# Patient Record
Sex: MALE | Race: ASIAN | NOT HISPANIC OR LATINO | URBAN - METROPOLITAN AREA
[De-identification: names, ages, dates, MRNs, and addresses within clinical notes are randomized per-mention and may not be internally consistent; named-entity substitution may affect disease eponyms.]

---

## 2018-09-01 ENCOUNTER — OUTPATIENT (OUTPATIENT)
Dept: OUTPATIENT SERVICES | Facility: HOSPITAL | Age: 28
LOS: 1 days | End: 2018-09-01
Payer: MEDICAID

## 2018-09-01 PROCEDURE — G9001: CPT

## 2018-09-02 ENCOUNTER — EMERGENCY (EMERGENCY)
Facility: HOSPITAL | Age: 28
LOS: 1 days | Discharge: HOME | End: 2018-09-02
Attending: EMERGENCY MEDICINE
Payer: MEDICAID

## 2018-09-02 VITALS
HEART RATE: 63 BPM | SYSTOLIC BLOOD PRESSURE: 109 MMHG | RESPIRATION RATE: 16 BRPM | DIASTOLIC BLOOD PRESSURE: 66 MMHG | TEMPERATURE: 98 F | OXYGEN SATURATION: 99 %

## 2018-09-02 VITALS
HEART RATE: 91 BPM | SYSTOLIC BLOOD PRESSURE: 134 MMHG | RESPIRATION RATE: 22 BRPM | TEMPERATURE: 98 F | OXYGEN SATURATION: 100 % | DIASTOLIC BLOOD PRESSURE: 76 MMHG

## 2018-09-02 DIAGNOSIS — F39 UNSPECIFIED MOOD [AFFECTIVE] DISORDER: ICD-10-CM

## 2018-09-02 DIAGNOSIS — F19.20 OTHER PSYCHOACTIVE SUBSTANCE DEPENDENCE, UNCOMPLICATED: ICD-10-CM

## 2018-09-02 LAB
ALBUMIN SERPL ELPH-MCNC: 4.7 G/DL — SIGNIFICANT CHANGE UP (ref 3.3–5)
ALP SERPL-CCNC: 82 U/L — SIGNIFICANT CHANGE UP (ref 40–120)
ALT FLD-CCNC: 30 U/L — SIGNIFICANT CHANGE UP (ref 10–45)
ANION GAP SERPL CALC-SCNC: 24 MMOL/L — HIGH (ref 5–17)
APAP SERPL-MCNC: <15 UG/ML — SIGNIFICANT CHANGE UP (ref 10–30)
AST SERPL-CCNC: 20 U/L — SIGNIFICANT CHANGE UP (ref 10–40)
BASOPHILS # BLD AUTO: 0.1 K/UL — SIGNIFICANT CHANGE UP (ref 0–0.2)
BASOPHILS NFR BLD AUTO: 0.5 % — SIGNIFICANT CHANGE UP (ref 0–2)
BILIRUB SERPL-MCNC: 0.7 MG/DL — SIGNIFICANT CHANGE UP (ref 0.2–1.2)
BUN SERPL-MCNC: 6 MG/DL — LOW (ref 7–23)
CALCIUM SERPL-MCNC: 9.5 MG/DL — SIGNIFICANT CHANGE UP (ref 8.4–10.5)
CHLORIDE SERPL-SCNC: 99 MMOL/L — SIGNIFICANT CHANGE UP (ref 96–108)
CO2 SERPL-SCNC: 16 MMOL/L — LOW (ref 22–31)
CREAT SERPL-MCNC: 1.15 MG/DL — SIGNIFICANT CHANGE UP (ref 0.5–1.3)
EOSINOPHIL # BLD AUTO: 0.3 K/UL — SIGNIFICANT CHANGE UP (ref 0–0.5)
EOSINOPHIL NFR BLD AUTO: 2 % — SIGNIFICANT CHANGE UP (ref 0–6)
ETHANOL SERPL-MCNC: SIGNIFICANT CHANGE UP MG/DL (ref 0–10)
GAS PNL BLDV: SIGNIFICANT CHANGE UP
GAS PNL BLDV: SIGNIFICANT CHANGE UP
GLUCOSE SERPL-MCNC: 215 MG/DL — HIGH (ref 70–99)
HCT VFR BLD CALC: 50.5 % — HIGH (ref 39–50)
HGB BLD-MCNC: 16.5 G/DL — SIGNIFICANT CHANGE UP (ref 13–17)
LYMPHOCYTES # BLD AUTO: 26.9 % — SIGNIFICANT CHANGE UP (ref 13–44)
LYMPHOCYTES # BLD AUTO: 4 K/UL — HIGH (ref 1–3.3)
MCHC RBC-ENTMCNC: 30.4 PG — SIGNIFICANT CHANGE UP (ref 27–34)
MCHC RBC-ENTMCNC: 32.7 GM/DL — SIGNIFICANT CHANGE UP (ref 32–36)
MCV RBC AUTO: 93 FL — SIGNIFICANT CHANGE UP (ref 80–100)
MONOCYTES # BLD AUTO: 0.8 K/UL — SIGNIFICANT CHANGE UP (ref 0–0.9)
MONOCYTES NFR BLD AUTO: 5.1 % — SIGNIFICANT CHANGE UP (ref 2–14)
NEUTROPHILS # BLD AUTO: 9.7 K/UL — HIGH (ref 1.8–7.4)
NEUTROPHILS NFR BLD AUTO: 65.5 % — SIGNIFICANT CHANGE UP (ref 43–77)
PLATELET # BLD AUTO: 345 K/UL — SIGNIFICANT CHANGE UP (ref 150–400)
POTASSIUM SERPL-MCNC: 3.7 MMOL/L — SIGNIFICANT CHANGE UP (ref 3.5–5.3)
POTASSIUM SERPL-SCNC: 3.7 MMOL/L — SIGNIFICANT CHANGE UP (ref 3.5–5.3)
PROT SERPL-MCNC: 8.2 G/DL — SIGNIFICANT CHANGE UP (ref 6–8.3)
RBC # BLD: 5.43 M/UL — SIGNIFICANT CHANGE UP (ref 4.2–5.8)
RBC # FLD: 12.3 % — SIGNIFICANT CHANGE UP (ref 10.3–14.5)
SALICYLATES SERPL-MCNC: <2 MG/DL — LOW (ref 15–30)
SODIUM SERPL-SCNC: 139 MMOL/L — SIGNIFICANT CHANGE UP (ref 135–145)
TSH SERPL-MCNC: 0.25 UIU/ML — LOW (ref 0.27–4.2)
WBC # BLD: 14.8 K/UL — HIGH (ref 3.8–10.5)
WBC # FLD AUTO: 14.8 K/UL — HIGH (ref 3.8–10.5)

## 2018-09-02 PROCEDURE — 82947 ASSAY GLUCOSE BLOOD QUANT: CPT

## 2018-09-02 PROCEDURE — 82330 ASSAY OF CALCIUM: CPT

## 2018-09-02 PROCEDURE — 82550 ASSAY OF CK (CPK): CPT

## 2018-09-02 PROCEDURE — 99285 EMERGENCY DEPT VISIT HI MDM: CPT | Mod: 25

## 2018-09-02 PROCEDURE — 84295 ASSAY OF SERUM SODIUM: CPT

## 2018-09-02 PROCEDURE — 85014 HEMATOCRIT: CPT

## 2018-09-02 PROCEDURE — 85027 COMPLETE CBC AUTOMATED: CPT

## 2018-09-02 PROCEDURE — 70450 CT HEAD/BRAIN W/O DYE: CPT | Mod: 26

## 2018-09-02 PROCEDURE — 99291 CRITICAL CARE FIRST HOUR: CPT

## 2018-09-02 PROCEDURE — 82435 ASSAY OF BLOOD CHLORIDE: CPT

## 2018-09-02 PROCEDURE — 83605 ASSAY OF LACTIC ACID: CPT

## 2018-09-02 PROCEDURE — 71045 X-RAY EXAM CHEST 1 VIEW: CPT | Mod: 26

## 2018-09-02 PROCEDURE — 82803 BLOOD GASES ANY COMBINATION: CPT

## 2018-09-02 PROCEDURE — 71045 X-RAY EXAM CHEST 1 VIEW: CPT

## 2018-09-02 PROCEDURE — 84443 ASSAY THYROID STIM HORMONE: CPT

## 2018-09-02 PROCEDURE — 84132 ASSAY OF SERUM POTASSIUM: CPT

## 2018-09-02 PROCEDURE — 82565 ASSAY OF CREATININE: CPT

## 2018-09-02 PROCEDURE — 93010 ELECTROCARDIOGRAM REPORT: CPT

## 2018-09-02 PROCEDURE — 93005 ELECTROCARDIOGRAM TRACING: CPT

## 2018-09-02 PROCEDURE — 80307 DRUG TEST PRSMV CHEM ANLYZR: CPT

## 2018-09-02 PROCEDURE — 82962 GLUCOSE BLOOD TEST: CPT

## 2018-09-02 PROCEDURE — 70450 CT HEAD/BRAIN W/O DYE: CPT

## 2018-09-02 PROCEDURE — 80053 COMPREHEN METABOLIC PANEL: CPT

## 2018-09-02 PROCEDURE — 90792 PSYCH DIAG EVAL W/MED SRVCS: CPT

## 2018-09-02 RX ORDER — SODIUM CHLORIDE 9 MG/ML
1000 INJECTION INTRAMUSCULAR; INTRAVENOUS; SUBCUTANEOUS ONCE
Qty: 0 | Refills: 0 | Status: DISCONTINUED | OUTPATIENT
Start: 2018-09-02 | End: 2018-09-06

## 2018-09-02 RX ORDER — SODIUM CHLORIDE 9 MG/ML
1000 INJECTION INTRAMUSCULAR; INTRAVENOUS; SUBCUTANEOUS ONCE
Qty: 0 | Refills: 0 | Status: COMPLETED | OUTPATIENT
Start: 2018-09-02 | End: 2018-09-02

## 2018-09-02 RX ADMIN — SODIUM CHLORIDE 2000 MILLILITER(S): 9 INJECTION INTRAMUSCULAR; INTRAVENOUS; SUBCUTANEOUS at 10:15

## 2018-09-02 NOTE — ED PROVIDER NOTE - OBJECTIVE STATEMENT
Resident: 27y M brought in by EMS for AMS. Patient was found down by family, vomiting. Hx attempted suicide/overdose with ectasy in past. Patient is awake and alert but does not respond to questions. Cuffed by police for safety, currently calm and following some commands. Tachy, diaphoretic, pupils 3-4mm normal. .

## 2018-09-02 NOTE — ED PROVIDER NOTE - PHYSICAL EXAMINATION
Resident:   Gen: awake, looking around, cool and diaphoretic, no acute distress  Head: NC, AT  ENT: PERRL, MMM  Chest: CTAB, no retractions, rate normal, appears to breathe comfortably  Heart: tachy regular S1S2, No peripheral edema, bilateral pulses in arms and legs  Abd: Soft non-tender, no rebound or guarding  Ext: Moving all 4 extremities without obvious impairment to ROM  Neuro: awake, intermittently following commands  Skin: scattered 1cm hyperpigmented maculopapules on arms

## 2018-09-02 NOTE — ED ADULT NURSE REASSESSMENT NOTE - NS ED NURSE REASSESS COMMENT FT1
Pt. returned from CT.
Pt. to CT with transport. Explained to patient he was going for CT scan and patient nodded that he understood.
Psych consult ordered. Security paged and now at bedside to wand patient and secure belongings. Patient's mother at bedside. Patient is now talking, alert and oriented x 3, calm. Patient denies taking drugs/nausea/pain. Pt. reports being cold. Warm blanket provided.

## 2018-09-02 NOTE — ED BEHAVIORAL HEALTH ASSESSMENT NOTE - NS ED [facility] OTHER STREET DRUGS MEDICATION
Partially impaired: cannot see medication labels or newsprint, but can see obstacles in path, and the surrounding layout; can count fingers at arm's length Yes

## 2018-09-02 NOTE — ED BEHAVIORAL HEALTH ASSESSMENT NOTE - RISK ASSESSMENT
Patient is at an elevated chronic risk of harm due to an underlying condition, polysubstance abuse and mood disorder NOS. Risk factors include recent humiliation, chronic passive SI, depressed mood, and poor sleep. Protective factors include social support from family and involvement in outpatient psychiatric treatment. At this time, patient denies active SI/I/P and denies HI, and is not at an acutely elevated risk of harm.

## 2018-09-02 NOTE — ED PROVIDER NOTE - PLAN OF CARE
ATTENDING ADDENDUM (Dr. Mayco Centeno): Goals of care include resolution of emergent/urgent symptoms and concerns, and restoration to baseline level of homeostasis.

## 2018-09-02 NOTE — ED PROVIDER NOTE - MEDICAL DECISION MAKING DETAILS
27y M with psych hx of suicide attempt 2 yrs ago [facility] non-verbal, agitated, diaphoretic. Family called EMS for not responding. Questionable drug overdose. Patient is tachycardic 100bpm, pupils normal size, pulse ox 100%, diaphoretic. Will obtain tox blood work, IV hydration, no reason for narcan or thiamine at this time. Will consult tox and psych. ZR

## 2018-09-02 NOTE — ED BEHAVIORAL HEALTH ASSESSMENT NOTE - SUMMARY
Patient is a 27-yo [place]an male, single, unemployed, enrolled at [employer], with mood disorder and polysubstance abuse, history of 1 SA by OD on ecstasy 2 years ago, who presented to [facility] ED for overdose. Patient reports chronic passive SI, but says that current OD was not intentional and he denies current active SI. Patient says that he used too much marijuana by mistake last night. He endorses depressive symptoms including poor sleep quality, low energy, decreased appetite, difficulty concentrating, and depressed mood. Patient has been heavily using xanax in recent months, with last use 5 days ago and involuntary jerking movements since stopping xanax.     Recommend that patient follow up with his outpatient psychiatrist regarding seroquel, risperdal, and zoloft. Recommend that patient seek drug treatment program for polysubstance abuse.

## 2018-09-02 NOTE — ED ADULT NURSE NOTE - CAS EDN DISCHARGE ASSESSMENT
Alert and oriented to person, place and time Symptoms improved/Alert and oriented to person, place and time

## 2018-09-02 NOTE — ED BEHAVIORAL HEALTH ASSESSMENT NOTE - HPI (INCLUDE ILLNESS QUALITY, SEVERITY, DURATION, TIMING, CONTEXT, MODIFYING FACTORS, ASSOCIATED SIGNS AND SYMPTOMS)
Patient is a 27-yo [place]ian male, single, domiciled with mom and siblings, enrolled in [employer] but currently not attending school, with reported psychiatric history of Bipolar Disorder and Somatiform Disorder, no prior psychiatric hospitalizations, 1 prior suicide attempt by OD on ecstasy 2 years ago, who presents to [facility] ED for drug overdose. Patient reports that last night, he took marijuana edibles and smoked 1-2 joints of marijuana, and then does not remember what happened next. Per mom and sister, patient was agitated, trying to fight people, and then found on the ground throwing up, was confused, and not arousable. Sister is concerned that patient took a drug other than marijuana, due to his severe confusion at the time.     Patient reports that he is in outpatient psychiatric treatment with Dr. Cifuentes and was on Seroquel 400mg QHS, Risperdal 1mg QHS, and Zoloft 100mg QD until he stopped taking his medications 4 months ago due to "feeling different" and some weight gain. Since then, patient has been using xanax heavily, especially during recent summer vacation in [place]. Patient reports he was taking up to approximately 10mg of xanax per day in [place]. He and family returned to the [place] from [place] vacation 10 days ago, and patient brought back some xanax with him. He continued to use xanax heavily until he ran out of xanax 5 days ago. Since then, he reports having severe jerking movements. In the last 10 days, patient also reports marijuana use and occasional alcohol use. He reports feeling sad in context of recent breakup with girl he was in an online relationship with. Patient reports poor sleep quality, anhedonia, decreased energy, decreased appetite, and chronic passive suicidal ideations. He denies active SI and denies HI. Patient does not have a gun at home.  In regards to manic symptoms, mom and sister report that patient has had periods of increased energy, decreased sleep, and extreme irritability/anger, but not recently. Patient denies current anxiety. In regards to perceptual disturbances, patient reports vague AH of a snake hissing last night in the context of marijuana use, and reports past AH of multiple voices whispering to him. He denies VH. He denies delusions.     Patient's family is concerned about his habitual drug use (marijuana, xanax). Per family, patient has been doing relatively well in last few weeks, was talking about looking for a job, and seemed motivated. Family says that when patient was compliant with Seroquel, Risperdal, and Zoloft, his mood was much better and behavior was more stable. Patient has also had trouble reducing amount of cigarette smoking; he previously was smoking 1/2PPD and mom is attempting to limit his smoking to 5 cigarettes/day. Patient is irritable about mom trying to reduce his smoking.

## 2018-09-02 NOTE — ED BEHAVIORAL HEALTH ASSESSMENT NOTE - DETAILS
feeling "not aligned" between left and right side of body Patient has history of SA by OD on ecstasy 2 years ago weight gain er

## 2018-09-02 NOTE — ED BEHAVIORAL HEALTH ASSESSMENT NOTE - SUICIDE RISK FACTORS
Access to means (pills, firearms, etc.)/Substance abuse/dependence/Global insomnia/Anhedonia/Mood episode

## 2018-09-02 NOTE — ED BEHAVIORAL HEALTH ASSESSMENT NOTE - PAST PSYCHOTROPIC MEDICATION
Until 4 months ago, patient was well-controlled on Seroquel 400mg QHS, Risperdal 1mg QHS, Zoloft 100mg QD. He became non-compliant due to feeling "strange" and some weight gain.

## 2018-09-02 NOTE — ED BEHAVIORAL HEALTH ASSESSMENT NOTE - CASE SUMMARY
Patient is a 27-yo [place] male, single, domiciled with mom and siblings, enrolled in [employer] but currently not attending school, with reported psychiatric history of Bipolar Disorder and Somatiform Disorder, no prior psychiatric hospitalizations, 1 prior suicide attempt by OD on ecstasy 2 years ago, who presents to [facility] ED for drug overdose. Patient reports that last night, he took marijuana edibles and smoked 1-2 joints of marijuana, and then does not remember what happened next. pt denies mood symptoms, no si/hi,no severe anxiety. no recent seizures since stopping benzos few days ago. collateral obtained from family. rec pt go to drug rehab, sw referral for list of rehabs.

## 2018-09-02 NOTE — ED BEHAVIORAL HEALTH ASSESSMENT NOTE - DESCRIPTION (FIRST USE, LAST USE, QUANTITY, FREQUENCY, DURATION)
patient smokes 1/2 PPD cigarettes social drinker, patient reports 2 cocktails on occasion Frequent cannabis use Xanax use with up to 10mg xanax/day, with last dose 5 days ago history of SA by OD on ecstasy 2 years ago, no use since then

## 2018-09-02 NOTE — ED ADULT NURSE NOTE - NSIMPLEMENTINTERV_GEN_ALL_ED
Implemented All Fall with Harm Risk Interventions:  Assawoman to call system. Call bell, personal items and telephone within reach. Instruct patient to call for assistance. Room bathroom lighting operational. Non-slip footwear when patient is off stretcher. Physically safe environment: no spills, clutter or unnecessary equipment. Stretcher in lowest position, wheels locked, appropriate side rails in place. Provide visual cue, wrist band, yellow gown, etc. Monitor gait and stability. Monitor for mental status changes and reorient to person, place, and time. Review medications for side effects contributing to fall risk. Reinforce activity limits and safety measures with patient and family. Provide visual clues: red socks.

## 2018-09-02 NOTE — ED PROVIDER NOTE - CARE PLAN
Goal:	ATTENDING ADDENDUM (Dr. Mayco Centeno): Goals of care include resolution of emergent/urgent symptoms and concerns, and restoration to baseline level of homeostasis. Principal Discharge DX:	Polysubstance (excluding opioids) dependence  Goal:	ATTENDING ADDENDUM (Dr. Mayco Centeno): Goals of care include resolution of emergent/urgent symptoms and concerns, and restoration to baseline level of homeostasis.

## 2018-09-02 NOTE — ED PROVIDER NOTE - SHIFT CHANGE DETAILS
***ATTENDING ADDENDUM (Dr. Mayco Centeno): I have received handoff from [facility]; followup toxicology consultation, ED diagnostics and serial reassessments. Will likely require psychiatric evaluation as well, as patient has history of suicidal ideation in the past. Likely admission, but disposition pending at this time.

## 2018-09-02 NOTE — ED BEHAVIORAL HEALTH ASSESSMENT NOTE - OTHER PAST PSYCHIATRIC HISTORY (INCLUDE DETAILS REGARDING ONSET, COURSE OF ILLNESS, INPATIENT/OUTPATIENT TREATMENT)
Patient reports that he has been diagnosed with Somatization Disorder and mom reports that patient has been diagnosed with Bipolar Disorder. He is in outpatient psychiatric treatment with psychiatrist Dr. Cifuentes and also sees therapist Mr. Shaver. No past psychiatric hospitalizations. 1 SA by OD on ecstasy 2 years ago.

## 2018-09-02 NOTE — ED PROVIDER NOTE - PROGRESS NOTE DETAILS
***ATTENDING ADDENDUM (Dr. Mayco Centeno): I have received handoff from [facility]. Patient presented to the ED with agitation and diaphoresis. Currently with improved symptoms. On physical examination, demonstrates spontaneous eye opening and non-labored breathing. Able to protect airway. Non-verbal. NO obvious seizure activity or focal or generalized weakness. Family (mother, daughter) arrived to ED, and report that patient has recently used marijuana and ecstasy over the past few days. Unsure if patient has increased suicidal ideation. POSITIVE psychiatric history (depression, suicidal ideation), on medications (unnamed). In differential, will consider: sympathomimetic v. anticholinergic toxidrome, serotonin syndrome, neuroleptic malignant syndrome, malignant hyperthermia (NO evidence), tyramine reaction (NO evidence), or equivalent toxicologic syndrome. Currently, as patient is calm and vital signs are "stable", patient does not warrant sedation (physical restraints and constant observation ordered by previous ED team). Will continue to observe and monitor closely. ECG noted (NO QT prolongation or QRS changes at this time). Anticipatory guidance provided. **ATTENDING ADDENDUM (Dr. Mayco Centeno): patient serially evaluated throughout ED course. NO acute deterioration up to this time in the ED. Initial ED diagnostics up to this time reviewed and noted. Leukocytosis noted (significance uncertain), elevated glucose (significance uncertain), NO hyponatremia (considered as part of possible ecstasy or other toxicant). ECG previously noted. NO interval seizure or arrhythmia reported. Will contact toxicology and psychiatry if patient more awake. Will continue to observe and monitor closely. Anticipatory guidance provided to attendant family members. Resident: Spoke to Tox (Livan), recommends fluids, 6hr obs, CK, VBG. Resident: discussed case with psych, cleared for discharge, recommend follow-up with outpatient psychiatry and outpatient drug rehab.

## 2018-09-02 NOTE — ED ADULT NURSE NOTE - OBJECTIVE STATEMENT
27 year old male brought in by EMS s/ 27 year old male brought in by EMS s/p vomiting and acting intoxicated at home. It is unknown what patient took. PMH of ecstasy and xanax use. As per EMS, patient was awake on floor of bedroom, vomited in room and in EMS truck, combative in route and was handcuffed and restrained. On arrival, patient is awake, refusing to speak, and diaphoretic. Pupils are 5mm, equal, round reactive. VSS.18g peripheral IV placed in left hand and right AC and labs drawn and sent to lab. Patient placed on CM - NSR. 1:1 called and in room. Patient undressed and placed into gown, side rails up with bed in lowest position for safety. blanket provided. Comfort and safety provided.

## 2018-09-02 NOTE — ED BEHAVIORAL HEALTH ASSESSMENT NOTE - DESCRIPTION
patient in unemployed, lives at home with mom and siblings, enrolled at [employer] but currently not in attendance. En route to the ED, patient was placed in handcuffs due to combative behavior. Upon arrival to ED, patient was more calm but minimally verbal, not cooperative with interview. He no longer required restraints but was placed on 1:1. Patient did not require IM or PO medication for agitation. When seen by psychiatry, patient was calm and cooperative with interview, lying in hospital bed with blanket, somewhat somnolent. no significant PMH

## 2018-09-02 NOTE — ED BEHAVIORAL HEALTH ASSESSMENT NOTE - SUICIDE PROTECTIVE FACTORS
Supportive social network or family/Positive therapeutic relationships/Responsibility to family and others/High spirituality

## 2018-09-06 LAB — DRUG SCREEN, SERUM: ABNORMAL

## 2018-09-10 DIAGNOSIS — Z71.89 OTHER SPECIFIED COUNSELING: ICD-10-CM
